# Patient Record
Sex: FEMALE | Race: WHITE | NOT HISPANIC OR LATINO
[De-identification: names, ages, dates, MRNs, and addresses within clinical notes are randomized per-mention and may not be internally consistent; named-entity substitution may affect disease eponyms.]

---
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
## 2023-02-28 ENCOUNTER — APPOINTMENT (OUTPATIENT)
Dept: PEDIATRICS | Facility: CLINIC | Age: 18
End: 2023-02-28
Payer: COMMERCIAL

## 2023-02-28 VITALS — BODY MASS INDEX: 27.77 KG/M2 | WEIGHT: 179 LBS | TEMPERATURE: 98.1 F | HEIGHT: 67.25 IN

## 2023-02-28 DIAGNOSIS — J01.90 ACUTE SINUSITIS, UNSPECIFIED: ICD-10-CM

## 2023-02-28 PROCEDURE — 99213 OFFICE O/P EST LOW 20 MIN: CPT

## 2023-02-28 RX ORDER — AMOXICILLIN AND CLAVULANATE POTASSIUM 875; 125 MG/1; MG/1
875-125 TABLET, COATED ORAL
Qty: 20 | Refills: 0 | Status: COMPLETED | COMMUNITY
Start: 2023-02-28 | End: 2023-03-10

## 2023-02-28 RX ORDER — POLYMYXIN B SULFATE AND TRIMETHOPRIM 10000; 1 [USP'U]/ML; MG/ML
10000-0.1 SOLUTION OPHTHALMIC 3 TIMES DAILY
Qty: 1 | Refills: 0 | Status: COMPLETED | COMMUNITY
Start: 2023-02-28 | End: 2023-03-07

## 2023-02-28 NOTE — DISCUSSION/SUMMARY
[FreeTextEntry1] : Increase fluids/avoid airway irritants\par Antibiotics as prescribed\par Tylenol or Motrin as needed for headache or fever.\par Symptomatic therapy\par Call if no better 3-5 days, sooner for change/concerns\par follow up in 2 weeks\par

## 2023-02-28 NOTE — PHYSICAL EXAM
[EOMI] : grossly EOMI [Conjuctival Injection] : conjunctival injection [Bilateral] : (bilateral) [Mucoid Discharge] : mucoid discharge [NL] : warm, clear [FreeTextEntry5] : bilateral eye discharge and eye redness

## 2023-02-28 NOTE — HISTORY OF PRESENT ILLNESS
[___ Week(s)] : [unfilled] week(s) [Constant] : constant [de-identified] : PERSISTENT WET COUGH, NASAL CONGESTION, EYE IRRITATION/DISCHARGE. NO FEVER. TAKEN OTC MEDICINE, NO RELIEF. [FreeTextEntry6] : 17 year old girl presents with cough, nasal congestion for the past 2-3 weeks and she has eye discharge bilaterally.No fever, severe headache, chest pain or abdominal pain.

## 2023-05-16 ENCOUNTER — APPOINTMENT (OUTPATIENT)
Dept: PEDIATRICS | Facility: CLINIC | Age: 18
End: 2023-05-16
Payer: COMMERCIAL

## 2023-05-16 VITALS
SYSTOLIC BLOOD PRESSURE: 106 MMHG | BODY MASS INDEX: 28.89 KG/M2 | HEART RATE: 78 BPM | TEMPERATURE: 97.6 F | WEIGHT: 186.25 LBS | DIASTOLIC BLOOD PRESSURE: 73 MMHG | HEIGHT: 67.25 IN

## 2023-05-16 PROCEDURE — 99213 OFFICE O/P EST LOW 20 MIN: CPT

## 2023-05-16 NOTE — HISTORY OF PRESENT ILLNESS
[de-identified] : RIGHT SIDED SHOULDER BLADE PAIN  [FreeTextEntry6] : Started with right sided shoulder blade pain 3 months ago. \par Is on the dance team and able to dance. \par The past 3 days pain has worsened. \par Denies any recent injury to arm. \par Denies any abdominal pain, burping or burning sensation.

## 2023-05-16 NOTE — DISCUSSION/SUMMARY
[FreeTextEntry1] : Rest and Ice \par NSAID PRN \par Follow up with Orthopedic - Referral given \par All questions answered. Caretaker understands and agrees with plan.\par If (+) new or worsening symptoms or (+) parental concern - return to office\par

## 2023-05-17 ENCOUNTER — OFFICE (OUTPATIENT)
Dept: URBAN - METROPOLITAN AREA CLINIC 104 | Facility: CLINIC | Age: 18
Setting detail: OPHTHALMOLOGY
End: 2023-05-17
Payer: COMMERCIAL

## 2023-05-17 DIAGNOSIS — H01.002: ICD-10-CM

## 2023-05-17 DIAGNOSIS — H01.005: ICD-10-CM

## 2023-05-17 DIAGNOSIS — H01.001: ICD-10-CM

## 2023-05-17 DIAGNOSIS — H01.004: ICD-10-CM

## 2023-05-17 PROCEDURE — 99213 OFFICE O/P EST LOW 20 MIN: CPT | Performed by: OPTOMETRIST

## 2023-05-17 ASSESSMENT — KERATOMETRY
OS_K1POWER_DIOPTERS: 43.05
OD_K2POWER_DIOPTERS: 44.64
OD_K1POWER_DIOPTERS: 43.32
OS_AXISANGLE_DEGREES: 082
OS_K2POWER_DIOPTERS: 44.47
OD_AXISANGLE_DEGREES: 090

## 2023-05-17 ASSESSMENT — REFRACTION_AUTOREFRACTION
OS_SPHERE: +0.50
OD_AXIS: 175
OD_SPHERE: +0.25
OS_AXIS: 122
OS_CYLINDER: -0.25
OD_CYLINDER: -0.75

## 2023-05-17 ASSESSMENT — CONFRONTATIONAL VISUAL FIELD TEST (CVF)
OD_FINDINGS: FULL
OS_FINDINGS: FULL

## 2023-05-17 ASSESSMENT — VISUAL ACUITY
OS_BCVA: 20/20-2
OD_BCVA: 20/20-1

## 2023-05-17 ASSESSMENT — LID EXAM ASSESSMENTS
OD_BLEPHARITIS: RLL RUL T 1+
OS_BLEPHARITIS: LLL LUL T 1+

## 2023-05-17 ASSESSMENT — TONOMETRY
OS_IOP_MMHG: 17
OD_IOP_MMHG: 17

## 2023-05-17 ASSESSMENT — SPHEQUIV_DERIVED
OD_SPHEQUIV: -0.125
OS_SPHEQUIV: 0.375

## 2023-05-17 ASSESSMENT — AXIALLENGTH_DERIVED
OD_AL: 23.465
OS_AL: 23.35

## 2023-05-25 ENCOUNTER — APPOINTMENT (OUTPATIENT)
Dept: ORTHOPEDIC SURGERY | Facility: CLINIC | Age: 18
End: 2023-05-25
Payer: COMMERCIAL

## 2023-05-25 ENCOUNTER — NON-APPOINTMENT (OUTPATIENT)
Age: 18
End: 2023-05-25

## 2023-05-25 VITALS — HEIGHT: 67 IN | BODY MASS INDEX: 28.25 KG/M2 | WEIGHT: 180 LBS

## 2023-05-25 PROCEDURE — 73030 X-RAY EXAM OF SHOULDER: CPT | Mod: RT

## 2023-05-25 PROCEDURE — 99203 OFFICE O/P NEW LOW 30 MIN: CPT

## 2023-07-03 ENCOUNTER — APPOINTMENT (OUTPATIENT)
Dept: PEDIATRICS | Facility: CLINIC | Age: 18
End: 2023-07-03
Payer: COMMERCIAL

## 2023-07-03 VITALS
BODY MASS INDEX: 29.51 KG/M2 | DIASTOLIC BLOOD PRESSURE: 75 MMHG | WEIGHT: 190.25 LBS | HEIGHT: 67.25 IN | HEART RATE: 82 BPM | SYSTOLIC BLOOD PRESSURE: 112 MMHG | TEMPERATURE: 98.5 F

## 2023-07-03 DIAGNOSIS — Z00.00 ENCOUNTER FOR GENERAL ADULT MEDICAL EXAMINATION W/OUT ABNORMAL FINDINGS: ICD-10-CM

## 2023-07-03 PROCEDURE — 99395 PREV VISIT EST AGE 18-39: CPT | Mod: 25

## 2023-07-03 PROCEDURE — 90471 IMMUNIZATION ADMIN: CPT

## 2023-07-03 PROCEDURE — 90472 IMMUNIZATION ADMIN EACH ADD: CPT

## 2023-07-03 PROCEDURE — 99173 VISUAL ACUITY SCREEN: CPT | Mod: 59

## 2023-07-03 PROCEDURE — 96127 BRIEF EMOTIONAL/BEHAV ASSMT: CPT

## 2023-07-03 PROCEDURE — 96160 PT-FOCUSED HLTH RISK ASSMT: CPT | Mod: 59

## 2023-07-03 PROCEDURE — 90715 TDAP VACCINE 7 YRS/> IM: CPT

## 2023-07-03 NOTE — HISTORY OF PRESENT ILLNESS
[Mother] : mother [Up to date] : Up to date [Normal] : normal [LMP: _____] : LMP: [unfilled] [Eats meals with family] : eats meals with family [Has family members/adults to turn to for help] : has family members/adults to turn to for help [Is permitted and is able to make independent decisions] : Is permitted and is able to make independent decisions [Grade: ____] : Grade: [unfilled] [Eats regular meals including adequate fruits and vegetables] : eats regular meals including adequate fruits and vegetables [Drinks non-sweetened liquids] : drinks non-sweetened liquids  [Calcium source] : calcium source [Has friends] : has friends [At least 1 hour of physical activity a day] : at least 1 hour of physical activity a day [Has interests/participates in community activities/volunteers] : has interests/participates in community activities/volunteers. [Drinks alcohol] : drinks alcohol [Exposure to alcohol] : exposure to alcohol [No] : No cigarette smoke exposure [Uses safety belts/safety equipment] : uses safety belts/safety equipment  [Has peer relationships free of violence] : has peer relationships free of violence [Yes] : Patient has had sexual intercourse. [HIV Screening Declined] : HIV Screening Declined [Has ways to cope with stress] : has ways to cope with stress [Displays self-confidence] : displays self-confidence [With Teen] : teen [Sleep Concerns] : no sleep concerns [Has concerns about body or appearance] : does not have concerns about body or appearance [Screen time (except homework) less than 2 hours a day] : no screen time (except homework) less than 2 hours a day [Uses electronic nicotine delivery system] : does not use electronic nicotine delivery system [Exposure to electronic nicotine delivery system] : no exposure to electronic nicotine delivery system [Uses tobacco] : does not use tobacco [Exposure to tobacco] : no exposure to tobacco [Uses drugs] : does not use drugs  [Exposure to drugs] : no exposure to drugs [Impaired/distracted driving] : no impaired/distracted driving [Has problems with sleep] : does not have problems with sleep [Gets depressed, anxious, or irritable/has mood swings] : does not get depressed, anxious, or irritable/has mood swings [Has thought about hurting self or considered suicide] : has not thought about hurting self or considered suicide [FreeTextEntry1] : ANNUAL PHYSICAL

## 2023-07-31 ENCOUNTER — APPOINTMENT (OUTPATIENT)
Dept: PEDIATRICS | Facility: CLINIC | Age: 18
End: 2023-07-31

## 2023-11-02 DIAGNOSIS — M25.511 PAIN IN RIGHT SHOULDER: ICD-10-CM

## 2023-11-02 DIAGNOSIS — S83.512A SPRAIN OF ANTERIOR CRUCIATE LIGAMENT OF LEFT KNEE, INITIAL ENCOUNTER: ICD-10-CM

## 2023-11-02 DIAGNOSIS — H10.30 UNSPECIFIED ACUTE CONJUNCTIVITIS, UNSPECIFIED EYE: ICD-10-CM

## 2023-11-02 DIAGNOSIS — S83.232A COMPLEX TEAR OF MEDIAL MENISCUS, CURRENT INJURY, LEFT KNEE, INITIAL ENCOUNTER: ICD-10-CM

## 2023-11-02 DIAGNOSIS — M99.02 SEGMENTAL AND SOMATIC DYSFUNCTION OF THORACIC REGION: ICD-10-CM

## 2024-05-22 ENCOUNTER — OFFICE (OUTPATIENT)
Dept: URBAN - METROPOLITAN AREA CLINIC 104 | Facility: CLINIC | Age: 19
Setting detail: OPHTHALMOLOGY
End: 2024-05-22
Payer: COMMERCIAL

## 2024-05-22 DIAGNOSIS — H01.001: ICD-10-CM

## 2024-05-22 DIAGNOSIS — H01.002: ICD-10-CM

## 2024-05-22 DIAGNOSIS — H01.005: ICD-10-CM

## 2024-05-22 DIAGNOSIS — H01.004: ICD-10-CM

## 2024-05-22 PROCEDURE — 92014 COMPRE OPH EXAM EST PT 1/>: CPT | Performed by: OPTOMETRIST

## 2024-05-22 ASSESSMENT — CONFRONTATIONAL VISUAL FIELD TEST (CVF)
OD_FINDINGS: FULL
OS_FINDINGS: FULL

## 2024-05-22 ASSESSMENT — LID EXAM ASSESSMENTS
OD_BLEPHARITIS: RLL RUL T
OS_BLEPHARITIS: LLL LUL T

## 2025-05-27 ENCOUNTER — OFFICE (OUTPATIENT)
Dept: URBAN - METROPOLITAN AREA CLINIC 104 | Facility: CLINIC | Age: 20
Setting detail: OPHTHALMOLOGY
End: 2025-05-27
Payer: COMMERCIAL

## 2025-05-27 DIAGNOSIS — H01.004: ICD-10-CM

## 2025-05-27 DIAGNOSIS — H01.005: ICD-10-CM

## 2025-05-27 DIAGNOSIS — H01.001: ICD-10-CM

## 2025-05-27 DIAGNOSIS — H01.002: ICD-10-CM

## 2025-05-27 PROCEDURE — 92014 COMPRE OPH EXAM EST PT 1/>: CPT | Performed by: OPTOMETRIST

## 2025-05-27 ASSESSMENT — LID EXAM ASSESSMENTS
OD_BLEPHARITIS: RLL RUL T
OS_BLEPHARITIS: LLL LUL T

## 2025-05-27 ASSESSMENT — KERATOMETRY
OD_K2POWER_DIOPTERS: 44.47
OS_AXISANGLE_DEGREES: 91
OS_K1POWER_DIOPTERS: 43.21
OD_K1POWER_DIOPTERS: 43.44
OD_AXISANGLE_DEGREES: 90
OS_K2POWER_DIOPTERS: 44.18

## 2025-05-27 ASSESSMENT — REFRACTION_AUTOREFRACTION
OS_SPHERE: PLANO
OS_AXIS: 52
OS_CYLINDER: -0.25
OD_CYLINDER: SPHERE
OD_SPHERE: -0.50

## 2025-05-27 ASSESSMENT — CONFRONTATIONAL VISUAL FIELD TEST (CVF)
OS_FINDINGS: FULL
OD_FINDINGS: FULL

## 2025-05-27 ASSESSMENT — VISUAL ACUITY
OS_BCVA: 20/20-2
OD_BCVA: 20/20-2